# Patient Record
Sex: MALE | Race: WHITE | Employment: FULL TIME | URBAN - METROPOLITAN AREA
[De-identification: names, ages, dates, MRNs, and addresses within clinical notes are randomized per-mention and may not be internally consistent; named-entity substitution may affect disease eponyms.]

---

## 2018-07-31 ENCOUNTER — HOSPITAL ENCOUNTER (OUTPATIENT)
Dept: ULTRASOUND IMAGING | Age: 48
Discharge: HOME | End: 2018-07-31
Attending: FAMILY MEDICINE
Payer: COMMERCIAL

## 2018-07-31 DIAGNOSIS — K59.00 CONSTIPATION, UNSPECIFIED CONSTIPATION TYPE: ICD-10-CM

## 2018-07-31 DIAGNOSIS — R19.5 CHANGE IN STOOL: ICD-10-CM

## 2018-07-31 PROCEDURE — 76700 US EXAM ABDOM COMPLETE: CPT

## 2018-07-31 NOTE — PROGRESS NOTES
Ultrasound - negative/normal.  No liver/gallbladder or kidney problems seen  Dr. Jese De La Cruz  Results released to [facility] with comments